# Patient Record
Sex: FEMALE | HISPANIC OR LATINO | URBAN - NONMETROPOLITAN AREA
[De-identification: names, ages, dates, MRNs, and addresses within clinical notes are randomized per-mention and may not be internally consistent; named-entity substitution may affect disease eponyms.]

---

## 2019-01-25 ENCOUNTER — OFFICE VISIT (OUTPATIENT)
Dept: URBAN - NONMETROPOLITAN AREA CLINIC 18 | Facility: CLINIC | Age: 54
End: 2019-01-25
Payer: MEDICAID

## 2019-01-25 DIAGNOSIS — H11.042 PERIPHERAL PTERYGIUM, STATIONARY, LEFT EYE: ICD-10-CM

## 2019-01-25 DIAGNOSIS — H33.321 ROUND HOLE, RIGHT EYE: ICD-10-CM

## 2019-01-25 DIAGNOSIS — H43.391 OTHER VITREOUS OPACITIES, RIGHT EYE: ICD-10-CM

## 2019-01-25 DIAGNOSIS — E11.3292 TYPE 2 DIAB W MILD NONPRLF DIABETIC RTNOP W/O MACULAR EDEMA, LEFT EYE: ICD-10-CM

## 2019-01-25 DIAGNOSIS — E11.3591 TYPE 2 DIABETES MELLITUS W/ PROLIFERATIVE DIABETIC RETINOPATHY W/O MACULAR EDEMA, RIGHT EYE: Primary | ICD-10-CM

## 2019-01-25 PROCEDURE — 92250 FUNDUS PHOTOGRAPHY W/I&R: CPT | Performed by: OPTOMETRIST

## 2019-01-25 PROCEDURE — 99214 OFFICE O/P EST MOD 30 MIN: CPT | Performed by: OPTOMETRIST

## 2019-01-25 ASSESSMENT — INTRAOCULAR PRESSURE
OS: 17
OD: 17

## 2019-01-25 NOTE — IMPRESSION/PLAN
Impression: Type 2 diab w mild nonprlf diabetic rtnop w/o macular edema, left eye: E05.7276. Plan: See above.

## 2019-01-25 NOTE — IMPRESSION/PLAN
Impression: Round hole, right eye: H33.321. Plan: Discussed with patient. No traction or recent symptoms. One single episode of light flash ~ 1month ago. All signs/symptoms and risks of retinal detachment and tears were discussed in detail. Patient instructed to call office immediately if any symptoms noted.

## 2019-02-12 ENCOUNTER — OFFICE VISIT (OUTPATIENT)
Dept: URBAN - METROPOLITAN AREA CLINIC 88 | Facility: CLINIC | Age: 54
End: 2019-02-12
Payer: MEDICAID

## 2019-02-12 DIAGNOSIS — E11.3293 TYPE 2 DIAB WITH MILD NONP RTNOP WITHOUT MACULAR EDEMA, BI: Primary | ICD-10-CM

## 2019-02-12 DIAGNOSIS — H43.811 VITREOUS DEGENERATION, RIGHT EYE: ICD-10-CM

## 2019-02-12 PROCEDURE — 99213 OFFICE O/P EST LOW 20 MIN: CPT | Performed by: OPHTHALMOLOGY

## 2019-02-12 PROCEDURE — 99202 OFFICE O/P NEW SF 15 MIN: CPT | Performed by: OPHTHALMOLOGY

## 2019-02-12 ASSESSMENT — INTRAOCULAR PRESSURE
OD: 15
OS: 15

## 2019-02-12 NOTE — IMPRESSION/PLAN
Impression: Round hole, right eye: H33.321. OD. Condition: established, stable. Symptoms: will continue to monitor. Plan: Advised patient of condition. CTL exam today. Discussed signs and symptoms of PVD/floaters. Patient instructed to call if condition gets worse.

## 2019-02-12 NOTE — IMPRESSION/PLAN
Impression: Type 2 diab with mild nonp rtnop without macular edema, bi: O68.4418. Condition: established, stable. Symptoms: will continue to monitor. Plan: Discussed diagnosis in detail with patient. Discussed ocular and systemic benefits of blood sugar control. Keep future appts. with PCP. No treatment necessary at this time. Patient was instructed to monitor vision for sudden changes and to call if visual changes noted.

## 2019-02-12 NOTE — IMPRESSION/PLAN
Impression: Vitreous degeneration, right eye: H43.811. OD. Condition: established, stable. Plan: Discussed signs and symptoms of PVD/floaters. Patient instructed to call the office immediately if any symptoms noted.

## 2020-05-28 ENCOUNTER — OFFICE VISIT (OUTPATIENT)
Dept: URBAN - NONMETROPOLITAN AREA CLINIC 18 | Facility: CLINIC | Age: 55
End: 2020-05-28
Payer: MEDICAID

## 2020-05-28 DIAGNOSIS — H25.13 AGE-RELATED NUCLEAR CATARACT, BILATERAL: ICD-10-CM

## 2020-05-28 DIAGNOSIS — H34.8311 TRIBUTARY (BRANCH) RETINAL VEIN OCCLUSION, RIGHT EYE, WITH RETINAL NEOVASCULARIZATION: ICD-10-CM

## 2020-05-28 PROCEDURE — 92250 FUNDUS PHOTOGRAPHY W/I&R: CPT | Performed by: OPTOMETRIST

## 2020-05-28 PROCEDURE — 99214 OFFICE O/P EST MOD 30 MIN: CPT | Performed by: OPTOMETRIST

## 2020-05-28 ASSESSMENT — INTRAOCULAR PRESSURE
OD: 15
OS: 15

## 2020-05-28 NOTE — IMPRESSION/PLAN
Impression: Tributary (branch) retinal vein occlusion, right eye, with retinal neovascularization: G88.3640. Plan: Discussed status in detail. Fundus photos today. /88 today. No signs of ischemia. Letter to PCP with labs to consider for diagnosis: A1C, CMP, lipid profile, CBC, PT/PTT and ESR. Will refer to [facility] for DM, occlusion evaluation and recommend cardiovascular work up with PCP. Letter to PCP today.

## 2020-05-28 NOTE — IMPRESSION/PLAN
Impression: Type 2 diabetes mellitus w/ proliferative diabetic retinopathy w/o macular edema, right eye: D30.6799. Plan: A detailed review of ocular findings was discussed with the patient. Inferior vascular abnormalities OD. The patient understands that at this time changes related to Diabetes are taking place within the eyes. Patient understands the importance of monitoring blood glucose and blood pressure levels with their primary care physician. Recommend referral to [facility] retina for additional evaluation and consideration for [facility] angio.

## 2020-05-28 NOTE — IMPRESSION/PLAN
Impression: Type 2 diab w mild nonprlf diabetic rtnop w/o macular edema, left eye: E11.3292. Plan: See above.

## 2020-09-08 NOTE — IMPRESSION/PLAN
Impression: Type 2 diabetes mellitus w/ proliferative diabetic retinopathy w/o macular edema, right eye: e11.3591. Plan: A detailed review of ocular findings was discussed with the patient. The patient understands that at this time changes related to Diabetes are taking place within the eyes. Baseline fundus photos OU today, PDR OD with jenn and mild NPDR OS. Operculated hole OD. Patient understands the importance of monitoring blood glucose and blood pressure levels with their primary care physician. What Type Of Note Output Would You Prefer (Optional)?: Bullet Format How Severe Is Your Skin Lesion?: moderate Has Your Skin Lesion Been Treated?: not been treated Is This A New Presentation, Or A Follow-Up?: Skin Lesions

## 2021-06-30 ENCOUNTER — OFFICE VISIT (OUTPATIENT)
Dept: URBAN - NONMETROPOLITAN AREA CLINIC 18 | Facility: CLINIC | Age: 56
End: 2021-06-30
Payer: MEDICAID

## 2021-06-30 DIAGNOSIS — H25.813 COMBINED FORMS OF AGE-RELATED CATARACT, BILATERAL: ICD-10-CM

## 2021-06-30 DIAGNOSIS — H43.813 VITREOUS DEGENERATION, BILATERAL: ICD-10-CM

## 2021-06-30 PROCEDURE — 99214 OFFICE O/P EST MOD 30 MIN: CPT | Performed by: OPTOMETRIST

## 2021-06-30 PROCEDURE — 92134 CPTRZ OPH DX IMG PST SGM RTA: CPT | Performed by: OPTOMETRIST

## 2021-06-30 ASSESSMENT — INTRAOCULAR PRESSURE
OS: 20
OD: 20

## 2021-06-30 NOTE — IMPRESSION/PLAN
Impression: Type 2 diab w moderate nonprlf diab rtnop w macular edema, left eye: B78.1288. Plan: Reviewed with patient that moderate retinal vascular changes are occurring from diabetes. OCT macula OU today, flat -CSME OD, borderline CSME OS. Will refer to Dr. Arthur Mendoza for evaluation and discuss treatment options.

## 2021-06-30 NOTE — IMPRESSION/PLAN
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 Plan: Reviewed with patient that moderate retinal vascular changes are occurring from diabetes. These changes do not require treatment at this time. Will continue to observe with dilated examinations.

## 2021-07-29 ENCOUNTER — OFFICE VISIT (OUTPATIENT)
Dept: URBAN - METROPOLITAN AREA CLINIC 88 | Facility: CLINIC | Age: 56
End: 2021-07-29
Payer: MEDICAID

## 2021-07-29 DIAGNOSIS — E11.3391 TYPE 2 DIAB W MODERATE NONPRLF DIAB RTNOP W/O MACULAR EDEMA, RIGHT EYE: ICD-10-CM

## 2021-07-29 PROCEDURE — 92250 FUNDUS PHOTOGRAPHY W/I&R: CPT | Performed by: OPHTHALMOLOGY

## 2021-07-29 PROCEDURE — 92014 COMPRE OPH EXAM EST PT 1/>: CPT | Performed by: OPHTHALMOLOGY

## 2021-07-29 RX ORDER — CARVEDILOL 6.25 MG/1
6.25 MG TABLET, FILM COATED ORAL
Qty: 0 | Refills: 0 | Status: ACTIVE
Start: 2021-07-29

## 2021-07-29 RX ORDER — LISINOPRIL 20 MG/1
20 MG TABLET ORAL
Qty: 0 | Refills: 0 | Status: ACTIVE
Start: 2021-07-29

## 2021-07-29 ASSESSMENT — INTRAOCULAR PRESSURE
OD: 14
OS: 15

## 2021-07-29 NOTE — IMPRESSION/PLAN
Impression: Type 2 diab with mod nonp rtnop with macular edema, l eye: I84.7949. Plan: Discussed eye findings with patient. I explained to the patient the treatment options regarding her Diabetic Macular edema and without treatment her vision gradually decrease. Once the swelling has decreased, we discuss Laser tx in the future. We recommend Lucentis injection, risks/benefits were discussed.

## 2021-07-29 NOTE — IMPRESSION/PLAN
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 Plan: Discussed diagnosis in detail with patient. Discussed ocular and systemic benefits of blood sugar control. Keep future appts. with PCP. No treatment necessary at this time. Patient was instructed to monitor vision for sudden changes and to call if visual changes noted.

## 2021-09-01 ENCOUNTER — PROCEDURE (OUTPATIENT)
Dept: URBAN - METROPOLITAN AREA CLINIC 88 | Facility: CLINIC | Age: 56
End: 2021-09-01
Payer: MEDICAID

## 2021-09-01 DIAGNOSIS — E11.3312 TYPE 2 DIABETES MELLITUS WITH MODERATE NONPROLIFERATIVE DIABETIC RETINOPATHY WITH MACULAR EDEMA, LEFT EYE: Primary | ICD-10-CM

## 2021-09-01 PROCEDURE — 67028 INJECTION EYE DRUG: CPT | Performed by: OPHTHALMOLOGY

## 2021-09-01 ASSESSMENT — INTRAOCULAR PRESSURE
OD: 17
OS: 19

## 2021-10-01 ENCOUNTER — PROCEDURE (OUTPATIENT)
Dept: URBAN - METROPOLITAN AREA CLINIC 88 | Facility: CLINIC | Age: 56
End: 2021-10-01
Payer: MEDICAID

## 2021-10-01 PROCEDURE — 67028 INJECTION EYE DRUG: CPT | Performed by: OPHTHALMOLOGY

## 2021-10-01 ASSESSMENT — INTRAOCULAR PRESSURE
OS: 25
OS: 15
OD: 15

## 2021-11-29 ENCOUNTER — PROCEDURE (OUTPATIENT)
Dept: URBAN - METROPOLITAN AREA CLINIC 88 | Facility: CLINIC | Age: 56
End: 2021-11-29
Payer: COMMERCIAL

## 2021-11-29 PROCEDURE — 67028 INJECTION EYE DRUG: CPT | Performed by: OPHTHALMOLOGY

## 2021-11-29 ASSESSMENT — INTRAOCULAR PRESSURE
OS: 15
OD: 15
OS: 24

## 2022-02-07 ENCOUNTER — OFFICE VISIT (OUTPATIENT)
Dept: URBAN - METROPOLITAN AREA CLINIC 88 | Facility: CLINIC | Age: 57
End: 2022-02-07
Payer: COMMERCIAL

## 2022-02-07 DIAGNOSIS — E11.3393 TYPE 2 DIAB WITH MOD NONP RTNOP WITHOUT MACULAR EDEMA, BI: Primary | ICD-10-CM

## 2022-02-07 PROCEDURE — 92012 INTRM OPH EXAM EST PATIENT: CPT | Performed by: OPHTHALMOLOGY

## 2022-02-07 PROCEDURE — 92134 CPTRZ OPH DX IMG PST SGM RTA: CPT | Performed by: OPHTHALMOLOGY

## 2022-02-07 ASSESSMENT — INTRAOCULAR PRESSURE
OS: 15
OD: 16

## 2022-02-07 NOTE — IMPRESSION/PLAN
Impression: Type 2 diab with mod nonp rtnop without macular edema, bi: G01.3736. Plan: Discussed diagnosis in detail with patient. Emphasized blood sugar control. Keep future appts with PCP. VA is stable. S/P Lucentis x 3 OS. OCT Macula OU today. Discussed ocular and systemic benefits of blood sugar control. Keep future appts. with PCP. No treatment necessary at this time. Patient was instructed to monitor vision for sudden changes and to call if visual changes noted.

## 2022-02-09 ENCOUNTER — APPOINTMENT (RX ONLY)
Dept: URBAN - METROPOLITAN AREA CLINIC 153 | Facility: CLINIC | Age: 57
Setting detail: DERMATOLOGY
End: 2022-02-09

## 2022-02-09 DIAGNOSIS — L30.0 NUMMULAR DERMATITIS: ICD-10-CM

## 2022-02-09 PROCEDURE — ? ADDITIONAL NOTES

## 2022-02-09 PROCEDURE — ? COUNSELING

## 2022-02-09 PROCEDURE — ? PRESCRIPTION

## 2022-02-09 PROCEDURE — 99203 OFFICE O/P NEW LOW 30 MIN: CPT

## 2022-02-09 RX ORDER — FLUOCINONIDE 0.5 MG/G
CREAM TOPICAL
Qty: 30 | Refills: 3 | Status: ERX | COMMUNITY
Start: 2022-02-09

## 2022-02-09 RX ADMIN — FLUOCINONIDE: 0.5 CREAM TOPICAL at 00:00

## 2022-02-09 ASSESSMENT — LOCATION DETAILED DESCRIPTION DERM
LOCATION DETAILED: MIDDLE STERNUM
LOCATION DETAILED: LEFT LATERAL ABDOMEN
LOCATION DETAILED: RIGHT LATERAL ABDOMEN

## 2022-02-09 ASSESSMENT — LOCATION SIMPLE DESCRIPTION DERM
LOCATION SIMPLE: CHEST
LOCATION SIMPLE: ABDOMEN

## 2022-02-09 ASSESSMENT — LOCATION ZONE DERM: LOCATION ZONE: TRUNK

## 2022-02-09 NOTE — PROCEDURE: ADDITIONAL NOTES
Additional Notes: Patient was introduced to apply cream on folds only for 5 days.
Render Risk Assessment In Note?: no
Detail Level: Simple

## 2022-07-13 ENCOUNTER — OFFICE VISIT (OUTPATIENT)
Dept: URBAN - METROPOLITAN AREA CLINIC 88 | Facility: CLINIC | Age: 57
End: 2022-07-13
Payer: COMMERCIAL

## 2022-07-13 DIAGNOSIS — H33.321 ROUND HOLE, RIGHT EYE: ICD-10-CM

## 2022-07-13 DIAGNOSIS — E11.3393 TYPE 2 DIAB WITH MOD NONP RTNOP WITHOUT MACULAR EDEMA, BI: Primary | ICD-10-CM

## 2022-07-13 DIAGNOSIS — H25.813 COMBINED FORMS OF AGE-RELATED CATARACT, BILATERAL: ICD-10-CM

## 2022-07-13 PROCEDURE — 92134 CPTRZ OPH DX IMG PST SGM RTA: CPT | Performed by: OPHTHALMOLOGY

## 2022-07-13 PROCEDURE — 92014 COMPRE OPH EXAM EST PT 1/>: CPT | Performed by: OPHTHALMOLOGY

## 2022-07-13 ASSESSMENT — INTRAOCULAR PRESSURE
OD: 15
OS: 15

## 2022-07-13 NOTE — IMPRESSION/PLAN
Impression: Round hole, right eye: H33.321. Plan: Discussed diagnosis in detail with patient. Observe.

## 2022-07-13 NOTE — IMPRESSION/PLAN
Impression: Type 2 diab with mod nonp rtnop without macular edema, bi: R74.0450. Plan: Discussed diagnosis in detail with patient. Discussed ocular and systemic benefits of blood sugar control. Keep future appts. with PCP. No treatment necessary at this time. Patient was instructed to monitor vision for sudden changes and to call if visual changes noted.

## 2023-01-17 ENCOUNTER — OFFICE VISIT (OUTPATIENT)
Dept: URBAN - METROPOLITAN AREA CLINIC 88 | Facility: CLINIC | Age: 58
End: 2023-01-17
Payer: COMMERCIAL

## 2023-01-17 DIAGNOSIS — H25.813 COMBINED FORMS OF AGE-RELATED CATARACT, BILATERAL: ICD-10-CM

## 2023-01-17 DIAGNOSIS — E11.3393 TYPE 2 DIAB WITH MOD NONP RTNOP WITHOUT MACULAR EDEMA, BI: ICD-10-CM

## 2023-01-17 DIAGNOSIS — H10.413 CONJUNCTIVITIS - ALLERGIC: Primary | ICD-10-CM

## 2023-01-17 PROCEDURE — 92014 COMPRE OPH EXAM EST PT 1/>: CPT | Performed by: OPHTHALMOLOGY

## 2023-01-17 PROCEDURE — 92134 CPTRZ OPH DX IMG PST SGM RTA: CPT | Performed by: OPHTHALMOLOGY

## 2023-01-17 ASSESSMENT — INTRAOCULAR PRESSURE
OD: 17
OS: 17

## 2023-01-17 NOTE — IMPRESSION/PLAN
Impression: Type 2 diab with mod nonp rtnop without macular edema, bi: Y45.8636. Plan: Discussed diagnosis in detail with patient. Discussed ocular and systemic benefits of blood sugar control. Keep future appts. with PCP. No treatment necessary at this time. Patient was instructed to monitor vision for sudden changes and to call if visual changes noted.

## 2023-01-17 NOTE — IMPRESSION/PLAN
Impression: Conjunctivitis - Allergic: H10.413. Plan: Discussed diagnosis in detail with patient.  I would recommend OTC Zaditor 1 qtt BID OU

## 2024-04-02 ENCOUNTER — OFFICE VISIT (OUTPATIENT)
Dept: URBAN - METROPOLITAN AREA CLINIC 88 | Facility: CLINIC | Age: 59
End: 2024-04-02
Payer: COMMERCIAL

## 2024-04-02 DIAGNOSIS — H25.813 COMBINED FORMS OF AGE-RELATED CATARACT, BILATERAL: ICD-10-CM

## 2024-04-02 DIAGNOSIS — H43.813 VITREOUS DEGENERATION, BILATERAL: ICD-10-CM

## 2024-04-02 DIAGNOSIS — E11.3293 TYPE 2 DIAB WITH MILD NONP RTNOP WITHOUT MACULAR EDEMA, BI: Primary | ICD-10-CM

## 2024-04-02 PROCEDURE — 92014 COMPRE OPH EXAM EST PT 1/>: CPT | Performed by: OPHTHALMOLOGY

## 2024-04-02 ASSESSMENT — INTRAOCULAR PRESSURE
OD: 16
OS: 16

## 2025-04-02 ENCOUNTER — OFFICE VISIT (OUTPATIENT)
Dept: URBAN - METROPOLITAN AREA CLINIC 88 | Facility: CLINIC | Age: 60
End: 2025-04-02
Payer: COMMERCIAL

## 2025-04-02 DIAGNOSIS — E11.3293 TYPE 2 DIAB WITH MILD NONP RTNOP WITHOUT MACULAR EDEMA, BI: Primary | ICD-10-CM

## 2025-04-02 DIAGNOSIS — H34.8312 TRIBUTARY (BRANCH) RETINAL VEIN OCCLUSION, RIGHT EYE, STABLE: ICD-10-CM

## 2025-04-02 DIAGNOSIS — H25.813 COMBINED FORMS OF AGE-RELATED CATARACT, BILATERAL: ICD-10-CM

## 2025-04-02 DIAGNOSIS — H33.321 ROUND HOLE, RIGHT EYE: ICD-10-CM

## 2025-04-02 PROCEDURE — 99213 OFFICE O/P EST LOW 20 MIN: CPT | Performed by: OPHTHALMOLOGY

## 2025-04-02 PROCEDURE — 92250 FUNDUS PHOTOGRAPHY W/I&R: CPT | Performed by: OPHTHALMOLOGY

## 2025-04-02 ASSESSMENT — INTRAOCULAR PRESSURE
OD: 13
OS: 13